# Patient Record
Sex: MALE | Race: WHITE | NOT HISPANIC OR LATINO | Employment: PART TIME | URBAN - METROPOLITAN AREA
[De-identification: names, ages, dates, MRNs, and addresses within clinical notes are randomized per-mention and may not be internally consistent; named-entity substitution may affect disease eponyms.]

---

## 2021-06-27 ENCOUNTER — HOSPITAL ENCOUNTER (EMERGENCY)
Facility: HOSPITAL | Age: 51
Discharge: HOME | End: 2021-06-27
Attending: EMERGENCY MEDICINE
Payer: COMMERCIAL

## 2021-06-27 VITALS
RESPIRATION RATE: 16 BRPM | OXYGEN SATURATION: 97 % | HEIGHT: 70 IN | BODY MASS INDEX: 26.48 KG/M2 | WEIGHT: 185 LBS | DIASTOLIC BLOOD PRESSURE: 92 MMHG | TEMPERATURE: 97.9 F | SYSTOLIC BLOOD PRESSURE: 147 MMHG | HEART RATE: 72 BPM

## 2021-06-27 DIAGNOSIS — F11.10 HEROIN ABUSE (HCC): ICD-10-CM

## 2021-06-27 DIAGNOSIS — F19.10 POLYSUBSTANCE ABUSE (HCC): Primary | ICD-10-CM

## 2021-06-27 LAB
AMPHETAMINES SERPL QL SCN: NEGATIVE
BARBITURATES UR QL: NEGATIVE
BENZODIAZ UR QL: POSITIVE
COCAINE UR QL: POSITIVE
ETHANOL EXG-MCNC: 0 MG/DL
GLUCOSE SERPL-MCNC: 152 MG/DL (ref 65–140)
METHADONE UR QL: POSITIVE
OPIATES UR QL SCN: POSITIVE
OXYCODONE+OXYMORPHONE UR QL SCN: NEGATIVE
PCP UR QL: NEGATIVE
THC UR QL: NEGATIVE

## 2021-06-27 PROCEDURE — 82948 REAGENT STRIP/BLOOD GLUCOSE: CPT

## 2021-06-27 PROCEDURE — 99284 EMERGENCY DEPT VISIT MOD MDM: CPT | Performed by: EMERGENCY MEDICINE

## 2021-06-27 PROCEDURE — 80307 DRUG TEST PRSMV CHEM ANLYZR: CPT | Performed by: EMERGENCY MEDICINE

## 2021-06-27 PROCEDURE — 82075 ASSAY OF BREATH ETHANOL: CPT | Performed by: EMERGENCY MEDICINE

## 2021-06-27 PROCEDURE — 99284 EMERGENCY DEPT VISIT MOD MDM: CPT

## 2021-06-27 RX ORDER — CLONIDINE HYDROCHLORIDE 0.1 MG/1
0.2 TABLET ORAL ONCE
Status: COMPLETED | OUTPATIENT
Start: 2021-06-27 | End: 2021-06-27

## 2021-06-27 RX ORDER — CLONIDINE HYDROCHLORIDE 0.2 MG/1
0.2 TABLET ORAL 2 TIMES DAILY
Qty: 6 TABLET | Refills: 0 | Status: SHIPPED | OUTPATIENT
Start: 2021-06-27 | End: 2021-06-30

## 2021-06-27 RX ORDER — ONDANSETRON 4 MG/1
4 TABLET, ORALLY DISINTEGRATING ORAL ONCE
Status: COMPLETED | OUTPATIENT
Start: 2021-06-27 | End: 2021-06-27

## 2021-06-27 RX ORDER — ONDANSETRON 4 MG/1
4 TABLET, FILM COATED ORAL EVERY 6 HOURS
Qty: 12 TABLET | Refills: 0 | Status: SHIPPED | OUTPATIENT
Start: 2021-06-27

## 2021-06-27 RX ADMIN — ONDANSETRON 4 MG: 4 TABLET, ORALLY DISINTEGRATING ORAL at 15:32

## 2021-06-27 RX ADMIN — CLONIDINE HYDROCHLORIDE 0.2 MG: 0.1 TABLET ORAL at 15:32

## 2021-06-27 NOTE — ED NOTES
This RN called [phone] CMP warm handoff and left a voicemail at [phone]  Pt's name and cell phone was left for follow-up        Morgan Sharma RN  06/27/21 6626

## 2021-06-27 NOTE — ED NOTES
[facility]     Fatuma Winston RN  06/27/21 3818

## 2021-06-27 NOTE — ED PROVIDER NOTES
Pt Name: Yumiko Parra  MRN: [id number]  [place] 1970  Age/Sex: 46 y o  male  Date of evaluation: 6/27/2021  PCP: Carlos Giles DO    CHIEF COMPLAINT    Chief Complaint   Patient presents with    Drug Problem     pt state he is using heroin, and cocain and is here for help with his addiction  HPI    46 y o  male presenting with request for referral to help with his drug problem  Patient states he is currently on methadone therapy, last dose was 3 days ago  He states that he uses heroin concurrently with his methadone, used crack cocaine and heroin yesterday  States he is here to get help, wants to be admitted for inpatient detox and rehab  He complains of nausea, shakiness, body aches but denies fever, vomiting, trauma, numbness, weakness, chest pain, trauma, other symptoms  HPI      Past Medical and Surgical History    Past Medical History:   Diagnosis Date    Addiction to drug ([facility] )     Substance abuse ([facility] )        History reviewed  No pertinent surgical history  History reviewed  No pertinent family history  Social History     Tobacco Use    Smoking status: Current Every Day Smoker     Packs/day: 1 00     Types: Cigarettes    Smokeless tobacco: Never Used   Vaping Use    Vaping Use: Never used   Substance Use Topics    Alcohol use: Never    Drug use: Yes     Frequency: 7 0 times per week     Types: Heroin, Cocaine     Comment: uses aprox 4-5 bags daily for aprox 2 yrs           Allergies    No Known Allergies    Home Medications    Prior to Admission medications    Not on File           Review of Systems    Review of Systems   Constitutional: Positive for fatigue  Negative for appetite change, chills and diaphoresis  HENT: Negative for drooling, facial swelling, trouble swallowing and voice change  Respiratory: Negative for apnea, shortness of breath and wheezing  Cardiovascular: Negative for chest pain and leg swelling  Gastrointestinal: Positive for nausea  Negative for abdominal distention, abdominal pain, diarrhea and vomiting  Genitourinary: Negative for dysuria and urgency  Musculoskeletal: Negative for arthralgias, back pain, gait problem and neck pain  Skin: Negative for color change, rash and wound  Neurological: Negative for seizures, speech difficulty, weakness and headaches  Psychiatric/Behavioral: Negative for agitation, behavioral problems and dysphoric mood  The patient is not nervous/anxious  All other systems reviewed and negative  Physical Exam      ED Triage Vitals   Temperature Pulse Respirations Blood Pressure SpO2   06/27/21 1519 06/27/21 1458 06/27/21 1458 06/27/21 1458 06/27/21 1458   97 9 °F (36 6 °C) 75 19 139/87 99 %      Temp Source Heart Rate Source Patient Position - Orthostatic VS BP Location FiO2 (%)   06/27/21 1519 06/27/21 1458 06/27/21 1458 06/27/21 1458 --   Oral Monitor Sitting Left arm       Pain Score       06/27/21 1458       No Pain               Physical Exam  Vitals and nursing note reviewed  Constitutional:       Appearance: He is well-developed  He is diaphoretic  HENT:      Head: Normocephalic and atraumatic  Right Ear: External ear normal       Left Ear: External ear normal    Eyes:      Conjunctiva/sclera: Conjunctivae normal       Pupils: Pupils are equal, round, and reactive to light  Neck:      Trachea: No tracheal deviation  Cardiovascular:      Rate and Rhythm: Normal rate and regular rhythm  Heart sounds: Normal heart sounds  No murmur heard  Pulmonary:      Effort: Pulmonary effort is normal  No respiratory distress  Breath sounds: Normal breath sounds  No stridor  No wheezing or rales  Abdominal:      General: There is no distension  Palpations: Abdomen is soft  Tenderness: There is no abdominal tenderness  There is no guarding or rebound  Musculoskeletal:         General: No deformity  Normal range of motion        Cervical back: Normal range of motion and neck supple  Skin:     General: Skin is warm  Findings: No rash  Neurological:      Mental Status: He is alert and oriented to person, place, and time  Psychiatric:         Behavior: Behavior normal          Thought Content: Thought content normal          Judgment: Judgment normal               Diagnostic Results      Labs:    Results Reviewed     Procedure Component Value Units Date/Time    Rapid drug screen, urine [[id number]]  (Abnormal) Collected: 06/27/21 1539    Lab Status: Final result Specimen: Urine, Clean Catch Updated: 06/27/21 1559     Amph/Meth UR Negative     Barbiturate Ur Negative     Benzodiazepine Urine Positive     Cocaine Urine Positive     Methadone Urine Positive     Opiate Urine Positive     PCP Ur Negative     THC Urine Negative     Oxycodone Urine Negative    Narrative:      Presumptive report  If requested, specimen will be sent to reference lab for confirmation  FOR MEDICAL PURPOSES ONLY  IF CONFIRMATION NEEDED PLEASE CONTACT THE LAB WITHIN 5 DAYS      Drug Screen Cutoff Levels:  AMPHETAMINE/METHAMPHETAMINES  1000 ng/mL  BARBITURATES     200 ng/mL  BENZODIAZEPINES     200 ng/mL  COCAINE      300 ng/mL  METHADONE      300 ng/mL  OPIATES      300 ng/mL  PHENCYCLIDINE     25 ng/mL  THC       50 ng/mL  OXYCODONE      100 ng/mL    Fingerstick Glucose (POCT) [[id number]]  (Abnormal) Collected: 06/27/21 1531    Lab Status: Final result Updated: 06/27/21 1535     POC Glucose 152 mg/dl     POCT alcohol breath test [[id number]]  (Normal) Resulted: 06/27/21 1519    Lab Status: Final result Updated: 06/27/21 1519     EXTBreath Alcohol 0 000          All labs reviewed and utilized in the medical decision making process    Radiology:    No orders to display       All radiology studies independently viewed by me and interpreted by the radiologist     Procedure    Procedures        ED Course of Care and Re-Assessments      Patient given Zofran and clonidine with improvement of symptoms, seen exam by crisis, unfortunately, no inpatient bed suitable for patient's needs is available this time  We also checked a detox unit at [facility] but I was informed by Dr Everett Quiroz that no beds were available at that facility either  Patient provided with outpatient resources, also recommended BST, patient states he is already following the methadone clinic and preferred to follow up there  Patient provided with nasal Narcan through community dispense program     Medications   ondansetron (ZOFRAN-ODT) dispersible tablet 4 mg (4 mg Oral Given 6/27/21 1532)   cloNIDine (CATAPRES) tablet 0 2 mg (0 2 mg Oral Given 6/27/21 1532)   naloxone nasal- Given to patient by provider at discharge  (NARCAN) 4 mg/0 1 mL nasal spray 4 mg (4 mg Does not apply Given by Other 6/27/21 7043)           FINAL IMPRESSION    Final diagnoses:   Polysubstance abuse ([facility] )   Heroin abuse ([facility])         DISPOSITION/PLAN    Polysubstance abuse as above  Vital signs examination reassuring, treated for withdrawal symptoms in emergency department  Unfortunately, with last methadone use 3 days ago, initiation of Suboxone is contraindicated at this time  Low suspicion for sepsis, meningitis, intra-abdominal infection, bacterial pneumonia, other acute life threat  After no local inpatient detox or rehab a beds were found to be available, patient provided with outpatient resources and discharged strict return precautions, follow up with his methadone Clinic and outpatient behavioral health resources    Time reflects when diagnosis was documented in both MDM as applicable and the Disposition within this note     Time User Action Codes Description Comment    6/27/2021  4:42 PM Malik Rowland Add [F19 10] Polysubstance abuse ([facility] )     6/27/2021  4:42 PM Malik Larger Add [F11 10] Heroin abuse [facility])       ED Disposition     ED Disposition Condition Date/Time Comment    Discharge Stable Sun Jun 27, 2021  4:42 PM Ju Calderón discharge to home/self care  Follow-up Information     Follow up With Specialties Details Why Contact Info Additional [address] [facility] Emergency Department Emergency Medicine Go to  If symptoms worsen [address] [facility] [address] [facility] Emergency Department, [address] [place], [place], [facility], DO Family Medicine Call in 1 day To discuss this visit schedule close follow-up [address] [place]  [phone]       Outpatient substance abuse resources  Call in 1 day To schedule inpatient detox and rehab as desired See attached packet             PATIENT REFERRED TO:    [address] [facility] Emergency Department  [address] [facility] [address] [phone]  Go to   If symptoms worsen    Matt Lancaster DO  [address] [place] [address] [address] [address] [phone]    Call in 1 day  To discuss this visit schedule close follow-up    Outpatient substance abuse resources  See attached packet  Call in 1 day  To schedule inpatient detox and rehab as desired      DISCHARGE MEDICATIONS:    Patient's Medications   Discharge Prescriptions    CLONIDINE (CATAPRES) 0 2 MG TABLET    Take 1 tablet (0 2 mg total) by mouth 2 (two) times a day for 6 doses       Start Date: 6/27/2021 End Date: 6/30/2021       Order Dose: 0 2 mg       Quantity: 6 tablet    Refills: 0    ONDANSETRON (ZOFRAN) 4 MG TABLET    Take 1 tablet (4 mg total) by mouth every 6 (six) hours       Start Date: 6/27/2021 End Date: --       Order Dose: 4 mg       Quantity: 12 tablet    Refills: 0       No discharge procedures on file           MD Liberty Deutsch MD  06/27/21 9800

## 2021-06-27 NOTE — ED NOTES
Pt presents to the ED as a self-referral from home seeking IP detox and rehab for use of heroin and crack/cocaine  Pt reports having used heroin for aprox 2 yrs now  Pt reports snorting aprox 4-5 bags daily  Pt reports using crack / cocaine on occasion  Pt denies SI's /HI's and or psychotic symptoms  Pt denies any hx of MH illness  Pt reports having a hx of diabetes and allegedly takes insulin  Pt states, "he's had the insulin available and can not report who prescribed it"  Pt believes "possibly aprox 1 yr ago he last saw a doctor for diabetes"  Pt has never attended IP or OP D&A detox / rehab  Pt allegedly attends the [facility] for Methadone and has been prescribed 82mg daily  Pt reports having been attending for aprox 6-7 mo's but did miss the past 3 days  Pt alleges, "he never stopped using heroin even while starting the methadone"  Pt was last at the clinic on Thursday  CW placed calls to [facility], spoke w/[place], NO BEDS    [facility], spoke w/Admissions, NO BEDS    [facility], spoke w/Admissions, NO BEDS    CW provided pt w/phone numbers to facilities listed above  CW recommended pt speak w/the facilities on a daily basis (2-3x day) until an opening is available      TDS, IRMA